# Patient Record
Sex: FEMALE | HISPANIC OR LATINO | Employment: STUDENT | URBAN - METROPOLITAN AREA
[De-identification: names, ages, dates, MRNs, and addresses within clinical notes are randomized per-mention and may not be internally consistent; named-entity substitution may affect disease eponyms.]

---

## 2021-07-23 ENCOUNTER — HOSPITAL ENCOUNTER (OUTPATIENT)
Dept: CARDIOLOGY | Facility: HOSPITAL | Age: 26
Discharge: HOME | End: 2021-07-23

## 2021-07-23 ENCOUNTER — OFFICE VISIT (OUTPATIENT)
Dept: CARDIOLOGY | Facility: HOSPITAL | Age: 26
End: 2021-07-23

## 2021-07-23 ENCOUNTER — TELEPHONE (OUTPATIENT)
Dept: CARDIOLOGY | Facility: HOSPITAL | Age: 26
End: 2021-07-23

## 2021-07-23 VITALS
TEMPERATURE: 97.9 F | OXYGEN SATURATION: 98 % | RESPIRATION RATE: 18 BRPM | HEIGHT: 70 IN | HEART RATE: 92 BPM | BODY MASS INDEX: 27.54 KG/M2 | SYSTOLIC BLOOD PRESSURE: 131 MMHG | WEIGHT: 192.38 LBS | DIASTOLIC BLOOD PRESSURE: 79 MMHG

## 2021-07-23 DIAGNOSIS — R06.02 SHORTNESS OF BREATH: ICD-10-CM

## 2021-07-23 DIAGNOSIS — R55 NEAR SYNCOPE: ICD-10-CM

## 2021-07-23 DIAGNOSIS — I10 ESSENTIAL HYPERTENSION: ICD-10-CM

## 2021-07-23 DIAGNOSIS — R00.2 PALPITATIONS: ICD-10-CM

## 2021-07-23 LAB
QT INTERVAL: 346 MS
QTC INTERVAL: 437 MS

## 2021-07-23 PROCEDURE — 93010 ELECTROCARDIOGRAM REPORT: CPT | Performed by: INTERNAL MEDICINE

## 2021-07-23 PROCEDURE — 93005 ELECTROCARDIOGRAM TRACING: CPT | Performed by: NURSE PRACTITIONER

## 2021-07-23 PROCEDURE — 99204 OFFICE O/P NEW MOD 45 MIN: CPT | Performed by: NURSE PRACTITIONER

## 2021-07-23 PROCEDURE — 93246 EXT ECG>7D<15D RECORDING: CPT

## 2021-07-23 RX ORDER — METOPROLOL SUCCINATE 25 MG/1
25 TABLET, EXTENDED RELEASE ORAL DAILY
Qty: 30 TABLET | Refills: 3 | Status: SHIPPED | OUTPATIENT
Start: 2021-07-23 | End: 2021-09-08 | Stop reason: SDUPTHER

## 2021-07-23 RX ORDER — NORGESTIMATE AND ETHINYL ESTRADIOL
1 KIT DAILY
COMMUNITY
Start: 2021-07-03 | End: 2022-06-29

## 2021-07-23 NOTE — PROGRESS NOTES
[facility] Heart Monitor Documentation    Laura Albarado  1995  [id number]  07/23/21    ADRIÁN Serna    [] ZIO XT Patch  Model [id number] Prescribed for N/A Days    · Serial Number: (N + 9 Digits) N   · Apply-By Date on Box:   · USPS Tracking Number:   · USPS Tracking        [] [employer] MINI PLUS Mobile Cardiac Telemetry  Model BGMINIPLUS Prescribed for N/A Days    · Serial Number: (BGM + 7 Digits) BGM  · Shipped-By Date on Box:   · UPS Tracking Number: [id number]  · UPS Tracking      [x] [employer] MINI Holter Monitor  Model BGMINIEL Prescribed for 14 Days    · Serial Number: (7 Digits) [id number]  · Shipped-By Date on Box: 07/20/21  · UPS Tracking Number: [id number]  · UPS Tracking        This monitor was applied to the patient's chest and checked for proper functioning.  Ms. Laura Albarado was instructed in the proper use of this monitor.  She was given the opportunity to ask questions and left the office with the device 's instruction manual.    Cherelle Tucker LPN, 15:46 EDT, 07/23/21                  [facility]

## 2021-07-23 NOTE — TELEPHONE ENCOUNTER
Please request last labs and office note with PCP (espec:  CBC, BMP/CMP, TSH, Lipids)    Please request last echo, heart monitor, and cardiology note from [facility].  Pt does not remember the name of the cardiologist.

## 2021-07-23 NOTE — PROGRESS NOTES
"[facility], [facility] Heart and Vascular    Chief Complaint  Palpitations    Subjective    History of Present Illness {CC  Problem List  Visit  Diagnosis   Encounters  Notes  Medications  Labs  Result Review Imaging  Media :23}     Laura Albarado presents to [facility] CARDIOLOGY for   History of Present Illness     27 yo female  Hx of palpitations, syncope when 17 years old.  \"intermittent VT\".  Treated at that time with BB.    Over the last year has noted activity intolerance, worsening dyspnea, palpations, fatigue, and chest discomfort, feelings of near syncope.  Weight gain noted. Elevated Blood pressure.     Pt reports recent elevated LFT and cortisol levels.  Thyroid, blood count, kidney function has been normal per report.    Patient currently in PA school.  States she is doing well.  Denies anxiety or worsening stressors.  States she is handling school well.    Reports having Covid in November.    Tries to exercise routinely, but has not had the energy to exercise as she would like.  Reports poor quality sleep.         Objective     Vital Signs:   Vitals:    07/23/21 1520 07/23/21 1522 07/23/21 1523   BP: 132/83 131/79 131/79   BP Location: Right arm Left arm Left arm   Patient Position: Sitting Standing Sitting   Cuff Size: Adult Adult Adult   Pulse: 97 104 92   Resp:   18   Temp:   97.9 °F (36.6 °C)   TempSrc:   Temporal   SpO2: 98% 98% 98%   Weight:   87.3 kg (192 lb 6 oz)   Height:   177.8 cm (70\")     Body mass index is 27.6 kg/m².  Physical Exam  Vitals reviewed.   Constitutional:       General: She is not in acute distress.     Appearance: Normal appearance.   Cardiovascular:      Rate and Rhythm: Normal rate and regular rhythm.      Pulses:           Radial pulses are 2+ on the right side.        Dorsalis pedis pulses are 2+ on the right side.        Posterior tibial pulses are 2+ on the right side.      Heart sounds: Normal heart sounds. "   Pulmonary:      Effort: Pulmonary effort is normal.      Breath sounds: Normal breath sounds.   Abdominal:      Palpations: Abdomen is soft.      Tenderness: There is no abdominal tenderness.   Musculoskeletal:      Right lower leg: No edema.      Left lower leg: No edema.   Skin:     General: Skin is warm and dry.   Neurological:      Mental Status: She is alert.   Psychiatric:         Mood and Affect: Mood normal.         Behavior: Behavior is cooperative.              Result Review  Data Reviewed:{ Labs  Result Review  Imaging  Med Tab  Media :23}     Request medical records from PCP and previous cardiology.     Assessment and Plan {CC Problem List  Visit Diagnosis  ROS  Review (Popup)  Health Maintenance  Quality  BestPractice  Medications  SmartSets  SnapShot Encounters  Media :23}   1. Palpitations    - metoprolol succinate XL (TOPROL-XL) 25 MG 24 hr tablet; Take 1 tablet by mouth Daily.  Dispense: 30 tablet; Refill: 3    2. Near syncope    - Holter Monitor - 72 Hour Up To 15 Days; Future    3. Shortness of breath    - Adult Transthoracic Echo Complete W/ Cont if Necessary Per Protocol; Future    4. Essential hypertension    - ECG 12 Lead; NSR 96 bpm          Follow Up {Instructions Charge Capture  Follow-up Communications :23}   Return in about 6 weeks (around 9/3/2021) for Video visit, palpitations, monitor results.    Patient was given instructions and counseling regarding her condition or for health maintenance advice. Please see specific information pulled into the AVS if appropriate.  Patient was instructed to call the [facility] with any questions, concerns, or worsening symptoms.    *Please note that portions of this note were completed with a voice recognition program. Efforts were made to edit the dictations, but occasionally words are mistranscribed.

## 2021-07-27 ENCOUNTER — TELEPHONE (OUTPATIENT)
Dept: CARDIOLOGY | Facility: HOSPITAL | Age: 26
End: 2021-07-27

## 2021-07-27 PROBLEM — F98.8 ADD (ATTENTION DEFICIT DISORDER): Status: ACTIVE | Noted: 2021-07-27

## 2021-07-27 NOTE — TELEPHONE ENCOUNTER
Received medical records from primary care provider for review    Labs completed on 5/3/2021:    Cortisol/a.m. 37.7    Plasma ACTH 34.5    Urinalysis negative    TSH 2.59    Total cholesterol 211, HDL 79, triglycerides 156, , VLDL 27    Glucose 82, BUN 14, creatinine 0.8, estimated , sodium 140, potassium 4.8, chloride 104, carbon dioxide 23, , AST 59    WBC 8.8, hemoglobin 13.5, hematocrit 40.0, platelet 326

## 2021-08-24 ENCOUNTER — HOSPITAL ENCOUNTER (OUTPATIENT)
Dept: CARDIOLOGY | Facility: HOSPITAL | Age: 26
Discharge: HOME | End: 2021-08-24
Admitting: NURSE PRACTITIONER

## 2021-08-24 VITALS — BODY MASS INDEX: 27.49 KG/M2 | HEIGHT: 70 IN | WEIGHT: 192 LBS

## 2021-08-24 DIAGNOSIS — R06.02 SHORTNESS OF BREATH: ICD-10-CM

## 2021-08-24 LAB
ASCENDING AORTA: 2.2 CM
BH CV ECHO MEAS - AO MAX PG (FULL): 1.6 MMHG
BH CV ECHO MEAS - AO MAX PG: 5 MMHG
BH CV ECHO MEAS - AO MEAN PG (FULL): 1 MMHG
BH CV ECHO MEAS - AO MEAN PG: 3 MMHG
BH CV ECHO MEAS - AO ROOT AREA (BSA CORRECTED): 1.1
BH CV ECHO MEAS - AO ROOT AREA: 4.2 CM^2
BH CV ECHO MEAS - AO ROOT DIAM: 2.3 CM
BH CV ECHO MEAS - AO V2 MAX: 113 CM/SEC
BH CV ECHO MEAS - AO V2 MEAN: 82.6 CM/SEC
BH CV ECHO MEAS - AO V2 VTI: 24.6 CM
BH CV ECHO MEAS - ASC AORTA: 2.2 CM
BH CV ECHO MEAS - AVA(I,A): 2.5 CM^2
BH CV ECHO MEAS - AVA(I,D): 2.5 CM^2
BH CV ECHO MEAS - AVA(V,A): 2.6 CM^2
BH CV ECHO MEAS - AVA(V,D): 2.6 CM^2
BH CV ECHO MEAS - BSA(HAYCOCK): 2.1 M^2
BH CV ECHO MEAS - BSA: 2.1 M^2
BH CV ECHO MEAS - BZI_BMI: 27.5 KILOGRAMS/M^2
BH CV ECHO MEAS - BZI_METRIC_HEIGHT: 177.8 CM
BH CV ECHO MEAS - BZI_METRIC_WEIGHT: 87.1 KG
BH CV ECHO MEAS - EDV(CUBED): 117.6 ML
BH CV ECHO MEAS - EDV(MOD-SP2): 105 ML
BH CV ECHO MEAS - EDV(MOD-SP4): 102 ML
BH CV ECHO MEAS - EDV(TEICH): 112.8 ML
BH CV ECHO MEAS - EF(CUBED): 74.7 %
BH CV ECHO MEAS - EF(MOD-BP): 66.5 %
BH CV ECHO MEAS - EF(MOD-SP2): 63.5 %
BH CV ECHO MEAS - EF(MOD-SP4): 68.8 %
BH CV ECHO MEAS - EF(TEICH): 66.4 %
BH CV ECHO MEAS - ESV(CUBED): 29.8 ML
BH CV ECHO MEAS - ESV(MOD-SP2): 38.3 ML
BH CV ECHO MEAS - ESV(MOD-SP4): 31.8 ML
BH CV ECHO MEAS - ESV(TEICH): 37.9 ML
BH CV ECHO MEAS - FS: 36.7 %
BH CV ECHO MEAS - IVS/LVPW: 0.8
BH CV ECHO MEAS - IVSD: 0.8 CM
BH CV ECHO MEAS - LA DIMENSION: 3.2 CM
BH CV ECHO MEAS - LA/AO: 1.4
BH CV ECHO MEAS - LAD MAJOR: 4.9 CM
BH CV ECHO MEAS - LAT PEAK E' VEL: 20.4 CM/SEC
BH CV ECHO MEAS - LATERAL E/E' RATIO: 4.5
BH CV ECHO MEAS - LV DIASTOLIC VOL/BSA (35-75): 49.7 ML/M^2
BH CV ECHO MEAS - LV IVRT: 0.06 SEC
BH CV ECHO MEAS - LV MASS(C)D: 153 GRAMS
BH CV ECHO MEAS - LV MASS(C)DI: 74.6 GRAMS/M^2
BH CV ECHO MEAS - LV MAX PG: 3.4 MMHG
BH CV ECHO MEAS - LV MEAN PG: 2 MMHG
BH CV ECHO MEAS - LV SYSTOLIC VOL/BSA (12-30): 15.5 ML/M^2
BH CV ECHO MEAS - LV V1 MAX: 92 CM/SEC
BH CV ECHO MEAS - LV V1 MEAN: 64.9 CM/SEC
BH CV ECHO MEAS - LV V1 VTI: 19.9 CM
BH CV ECHO MEAS - LVIDD: 4.9 CM
BH CV ECHO MEAS - LVIDS: 3.1 CM
BH CV ECHO MEAS - LVLD AP2: 9 CM
BH CV ECHO MEAS - LVLD AP4: 8.5 CM
BH CV ECHO MEAS - LVLS AP2: 7.3 CM
BH CV ECHO MEAS - LVLS AP4: 7 CM
BH CV ECHO MEAS - LVOT AREA (M): 3.1 CM^2
BH CV ECHO MEAS - LVOT AREA: 3.1 CM^2
BH CV ECHO MEAS - LVOT DIAM: 2 CM
BH CV ECHO MEAS - LVPWD: 1 CM
BH CV ECHO MEAS - MED PEAK E' VEL: 13.7 CM/SEC
BH CV ECHO MEAS - MEDIAL E/E' RATIO: 6.7
BH CV ECHO MEAS - MR MAX PG: 25 MMHG
BH CV ECHO MEAS - MR MAX VEL: 248 CM/SEC
BH CV ECHO MEAS - MV A MAX VEL: 51.4 CM/SEC
BH CV ECHO MEAS - MV DEC SLOPE: 313 CM/SEC^2
BH CV ECHO MEAS - MV DEC TIME: 0.24 SEC
BH CV ECHO MEAS - MV E MAX VEL: 92.1 CM/SEC
BH CV ECHO MEAS - MV E/A: 1.8
BH CV ECHO MEAS - MV MAX PG: 3.6 MMHG
BH CV ECHO MEAS - MV MEAN PG: 2 MMHG
BH CV ECHO MEAS - MV P1/2T MAX VEL: 98.4 CM/SEC
BH CV ECHO MEAS - MV P1/2T: 92.1 MSEC
BH CV ECHO MEAS - MV V2 MAX: 94.7 CM/SEC
BH CV ECHO MEAS - MV V2 MEAN: 57.8 CM/SEC
BH CV ECHO MEAS - MV V2 VTI: 31.2 CM
BH CV ECHO MEAS - MVA P1/2T LCG: 2.2 CM^2
BH CV ECHO MEAS - MVA(P1/2T): 2.4 CM^2
BH CV ECHO MEAS - MVA(VTI): 2 CM^2
BH CV ECHO MEAS - PA ACC TIME: 0.18 SEC
BH CV ECHO MEAS - PA MAX PG: 3.8 MMHG
BH CV ECHO MEAS - PA PR(ACCEL): -2 MMHG
BH CV ECHO MEAS - PA V2 MAX: 96.8 CM/SEC
BH CV ECHO MEAS - PI END-D VEL: 100 CM/SEC
BH CV ECHO MEAS - RAP SYSTOLE: 3 MMHG
BH CV ECHO MEAS - RVSP: 12 MMHG
BH CV ECHO MEAS - SI(AO): 49.8 ML/M^2
BH CV ECHO MEAS - SI(CUBED): 42.8 ML/M^2
BH CV ECHO MEAS - SI(LVOT): 30.5 ML/M^2
BH CV ECHO MEAS - SI(MOD-SP2): 32.5 ML/M^2
BH CV ECHO MEAS - SI(MOD-SP4): 34.2 ML/M^2
BH CV ECHO MEAS - SI(TEICH): 36.5 ML/M^2
BH CV ECHO MEAS - SV(AO): 102.2 ML
BH CV ECHO MEAS - SV(CUBED): 87.9 ML
BH CV ECHO MEAS - SV(LVOT): 62.5 ML
BH CV ECHO MEAS - SV(MOD-SP2): 66.7 ML
BH CV ECHO MEAS - SV(MOD-SP4): 70.2 ML
BH CV ECHO MEAS - SV(TEICH): 74.9 ML
BH CV ECHO MEAS - TAPSE (>1.6): 1.8 CM
BH CV ECHO MEAS - TR MAX PG: 9 MMHG
BH CV ECHO MEAS - TR MAX VEL: 146 CM/SEC
BH CV ECHO MEASUREMENTS AVERAGE E/E' RATIO: 5.4
BH CV VAS BP LEFT ARM: NORMAL MMHG
BH CV XLRA - RV BASE: 3.7 CM
BH CV XLRA - RV LENGTH: 6.4 CM
BH CV XLRA - RV MID: 3.3 CM
BH CV XLRA - TDI S': 13.4 CM/SEC
IVRT: 55 MSEC
LEFT ATRIUM VOLUME INDEX: 18.6 ML/M^2
LEFT ATRIUM VOLUME: 38.2 ML
LV EF 2D ECHO EST: 60 %

## 2021-08-24 PROCEDURE — 93306 TTE W/DOPPLER COMPLETE: CPT

## 2021-08-24 PROCEDURE — 93306 TTE W/DOPPLER COMPLETE: CPT | Performed by: INTERNAL MEDICINE

## 2021-08-25 PROBLEM — R00.2 PALPITATIONS: Status: ACTIVE | Noted: 2021-08-25

## 2021-09-01 ENCOUNTER — APPOINTMENT (OUTPATIENT)
Dept: CARDIOLOGY | Facility: HOSPITAL | Age: 26
End: 2021-09-01

## 2021-09-01 NOTE — PROGRESS NOTES
[facility], [facility] Heart and Vascular    This was an audio and video enabled telemedicine encounter.    Chief Complaint  No chief complaint on file.    Subjective    History of Present Illness {CC  Problem List  Visit  Diagnosis   Encounters  Notes  Medications  Labs  Result Review Imaging  Media :23}     Laura Albarado presents to [facility] CARDIOLOGY for   History of Present Illness     26-year-old female with palpitations, history of syncope since she was 17 years old.    Over the last year complaining of activity intolerance, dyspnea, palpitations, fatigue, chest discomfort, feelings of near syncope.  Reports blood pressures have been elevated.  Noted weight gain.    Patient is currently in physician assistant school.    Covid noted in November 2020..  Attempt to exercise routinely but has not had the energy to exercise as she would like.  Reports poor sleep quality.    Last office visit patient was started on metoprolol for palpitations.  Placed on Holter, echo ordered.  Objective     Vital Signs:   There were no vitals filed for this visit.  There is no height or weight on file to calculate BMI.  Physical Exam         Result Review  Data Reviewed:{ Labs  Result Review  Imaging  Med Tab  Media :23}   Echocardiogram 8/24/2021: EF 60%, cardiac valves anatomically and functionally normal    Extended 14-day Holter 7/23/2021: Average heart rate 75 bpm, rare PACs less than 1%.  Patient activated symptoms  {Data reviewed (Optional):83779}            Assessment and Plan {CC Problem List  Visit Diagnosis  ROS  Review (Popup)  Health Maintenance  Quality  BestPractice  Medications  SmartSets  SnapShot Encounters  Media :23}   1. Palpitations  ***    2. Shortness of breath  ***    3. Near syncope  ***    4. Essential hypertension  ***      {Time Spent (Optional):13134}    Follow Up {Instructions Charge Capture  Follow-up Communications :23}   No  follow-ups on file.    Patient was given instructions and counseling regarding her condition or for health maintenance advice. Please see specific information pulled into the AVS if appropriate.  Patient was instructed to call the [facility] with any questions, concerns, or worsening symptoms.    *Please note that portions of this note were completed with a voice recognition program. Efforts were made to edit the dictations, but occasionally words are mistranscribed.

## 2021-09-02 ENCOUNTER — APPOINTMENT (OUTPATIENT)
Dept: CARDIOLOGY | Facility: HOSPITAL | Age: 26
End: 2021-09-02

## 2021-09-08 ENCOUNTER — TELEMEDICINE (OUTPATIENT)
Dept: CARDIOLOGY | Facility: HOSPITAL | Age: 26
End: 2021-09-08

## 2021-09-08 VITALS — HEIGHT: 70 IN | HEART RATE: 80 BPM | BODY MASS INDEX: 26.92 KG/M2 | WEIGHT: 188 LBS | TEMPERATURE: 97.8 F

## 2021-09-08 DIAGNOSIS — R06.02 SHORTNESS OF BREATH: ICD-10-CM

## 2021-09-08 DIAGNOSIS — R00.2 PALPITATIONS: Primary | ICD-10-CM

## 2021-09-08 DIAGNOSIS — I10 ESSENTIAL HYPERTENSION: ICD-10-CM

## 2021-09-08 PROCEDURE — 99214 OFFICE O/P EST MOD 30 MIN: CPT | Performed by: NURSE PRACTITIONER

## 2021-09-08 RX ORDER — METOPROLOL SUCCINATE 25 MG/1
25 TABLET, EXTENDED RELEASE ORAL DAILY
Qty: 30 TABLET | Refills: 6 | Status: SHIPPED | OUTPATIENT
Start: 2021-09-08

## 2021-09-08 RX ORDER — BUPROPION HYDROCHLORIDE 150 MG/1
1 TABLET ORAL DAILY
COMMUNITY
Start: 2021-07-27 | End: 2022-06-29

## 2021-09-08 NOTE — PROGRESS NOTES
"[facility], [facility] Heart and Vascular    This was an audio and video enabled telemedicine encounter.    Chief Complaint  Follow-up (monitor results)    Subjective    History of Present Illness {CC  Problem List  Visit  Diagnosis   Encounters  Notes  Medications  Labs  Result Review Imaging  Media :23}     Laura Albarado presents to [facility] CARDIOLOGY for   History of Present Illness       26-year-old female with palpitations, history of syncope since she was 17 years old.    Over the last year complaining of activity intolerance, dyspnea, palpitations, fatigue, chest discomfort, feelings of near syncope.  Reports blood pressures have been elevated.  Noted weight gain.    Patient is currently in physician assistant school.    Covid noted in November 2020.  Attempt to exercise routinely but has not had the energy to exercise as she would like.  Reports poor sleep quality.    Last office visit patient was started on metoprolol for palpitations.  Placed on Holter, echo ordered.    Patient reports improvement of palpitations and dizziness while being on metoprolol.  She denies near syncope, syncope, chest pain, pressure, dyspnea.    Patient has not been keeping a home blood pressure log.    Objective     Vital Signs:   Vitals:    09/08/21 1347   Pulse: 80   Temp: 97.8 °F (36.6 °C)   TempSrc: Oral   Weight: 85.3 kg (188 lb)   Height: 177.8 cm (70\")     Body mass index is 26.98 kg/m².  Physical Exam  Vitals reviewed.   Constitutional:       General: She is not in acute distress.  Pulmonary:      Effort: Pulmonary effort is normal.   Skin:     Coloration: Skin is not pale.   Neurological:      Mental Status: She is alert.   Psychiatric:         Mood and Affect: Mood normal.         Behavior: Behavior normal. Behavior is cooperative.              Result Review  Data Reviewed:{ Labs  Result Review  Imaging  Med Tab  Media :23}   Echocardiogram 8/24/2021: EF 60%, " cardiac valves anatomically and functionally normal    Extended 14-day Holter 7/23/2021: Average heart rate 75 bpm, rare PACs less than 1%.  Patient activated symptoms primarily associated with sinus.  Assessment and Plan {CC Problem List  Visit Diagnosis  ROS  Review (Popup)  Health Maintenance  Quality  BestPractice  Medications  SmartSets  SnapShot Encounters  Media :23}   1. Palpitations  Reviewed heart monitor results.  No significant arrhythmias.  Palpitations improved with beta-blocker.    We will continue with refills    2. Essential hypertension  Patient not been keeping a home blood pressure log, but will start.  Denies signs and symptoms associated with hypotension.    3. Shortness of breath  No significant dyspnea.  Echo normal.    Patient will follow up in 3 months or sooner if needed      I spent 20 minutes caring for Laura on this date of service. This time includes time spent by me in the following activities:preparing for the visit, reviewing tests, performing a medically appropriate examination and/or evaluation , counseling and educating the patient/family/caregiver, ordering medications, tests, or procedures and documenting information in the medical record    Follow Up {Instructions Charge Capture  Follow-up Communications :23}   Return in about 3 months (around 12/8/2021) for Video visit, palpitations, HTN.    Patient was given instructions and counseling regarding her condition or for health maintenance advice. Please see specific information pulled into the AVS if appropriate.  Patient was instructed to call the [facility] with any questions, concerns, or worsening symptoms.    *Please note that portions of this note were completed with a voice recognition program. Efforts were made to edit the dictations, but occasionally words are mistranscribed.

## 2021-09-17 PROCEDURE — 93248 EXT ECG>7D<15D REV&INTERPJ: CPT | Performed by: INTERNAL MEDICINE

## 2021-11-05 ENCOUNTER — OFFICE VISIT (OUTPATIENT)
Dept: INTERNAL MEDICINE | Facility: CLINIC | Age: 26
End: 2021-11-05

## 2021-11-05 ENCOUNTER — LAB (OUTPATIENT)
Dept: LAB | Facility: HOSPITAL | Age: 26
End: 2021-11-05

## 2021-11-05 VITALS
HEIGHT: 70 IN | RESPIRATION RATE: 16 BRPM | TEMPERATURE: 97.7 F | BODY MASS INDEX: 28.66 KG/M2 | WEIGHT: 200.2 LBS | OXYGEN SATURATION: 98 % | HEART RATE: 78 BPM | DIASTOLIC BLOOD PRESSURE: 84 MMHG | SYSTOLIC BLOOD PRESSURE: 140 MMHG

## 2021-11-05 DIAGNOSIS — R63.5 ABNORMAL WEIGHT GAIN: Primary | ICD-10-CM

## 2021-11-05 DIAGNOSIS — R79.89 ELEVATED LFTS: ICD-10-CM

## 2021-11-05 DIAGNOSIS — R53.82 CHRONIC FATIGUE: ICD-10-CM

## 2021-11-05 DIAGNOSIS — L68.0 HIRSUTISM: ICD-10-CM

## 2021-11-05 DIAGNOSIS — N92.6 MENSTRUAL IRREGULARITY: ICD-10-CM

## 2021-11-05 DIAGNOSIS — R00.2 PALPITATIONS: ICD-10-CM

## 2021-11-05 DIAGNOSIS — Z30.9 ENCOUNTER FOR CONTRACEPTIVE MANAGEMENT, UNSPECIFIED TYPE: ICD-10-CM

## 2021-11-05 DIAGNOSIS — F98.8 ATTENTION DEFICIT DISORDER, UNSPECIFIED HYPERACTIVITY PRESENCE: ICD-10-CM

## 2021-11-05 DIAGNOSIS — R79.89 HIGH SERUM CORTISOL: ICD-10-CM

## 2021-11-05 DIAGNOSIS — R63.5 ABNORMAL WEIGHT GAIN: ICD-10-CM

## 2021-11-05 DIAGNOSIS — Z87.42 HISTORY OF OVARIAN CYST: ICD-10-CM

## 2021-11-05 LAB
25(OH)D3 SERPL-MCNC: 27.8 NG/ML
ALBUMIN SERPL-MCNC: 4.7 G/DL (ref 3.5–5.2)
ALBUMIN/GLOB SERPL: 1.5 G/DL
ALP SERPL-CCNC: 75 U/L (ref 39–117)
ALT SERPL W P-5'-P-CCNC: 266 U/L (ref 1–33)
ANION GAP SERPL CALCULATED.3IONS-SCNC: 11 MMOL/L (ref 5–15)
AST SERPL-CCNC: 112 U/L (ref 1–32)
BILIRUB SERPL-MCNC: 0.4 MG/DL (ref 0–1.2)
BUN SERPL-MCNC: 9 MG/DL (ref 6–20)
BUN/CREAT SERPL: 12 (ref 7–25)
CALCIUM SPEC-SCNC: 9.4 MG/DL (ref 8.6–10.5)
CHLORIDE SERPL-SCNC: 100 MMOL/L (ref 98–107)
CO2 SERPL-SCNC: 25 MMOL/L (ref 22–29)
CREAT SERPL-MCNC: 0.75 MG/DL (ref 0.57–1)
DEPRECATED RDW RBC AUTO: 39.2 FL (ref 37–54)
ERYTHROCYTE [DISTWIDTH] IN BLOOD BY AUTOMATED COUNT: 12.2 % (ref 12.3–15.4)
GFR SERPL CREATININE-BSD FRML MDRD: 112 ML/MIN/1.73
GFR SERPL CREATININE-BSD FRML MDRD: 92 ML/MIN/1.73
GLOBULIN UR ELPH-MCNC: 3.2 GM/DL
GLUCOSE SERPL-MCNC: 81 MG/DL (ref 65–99)
HCT VFR BLD AUTO: 38.9 % (ref 34–46.6)
HGB BLD-MCNC: 13.6 G/DL (ref 12–15.9)
MCH RBC QN AUTO: 30.8 PG (ref 26.6–33)
MCHC RBC AUTO-ENTMCNC: 35 G/DL (ref 31.5–35.7)
MCV RBC AUTO: 88.2 FL (ref 79–97)
PLATELET # BLD AUTO: 369 10*3/MM3 (ref 140–450)
PMV BLD AUTO: 11.5 FL (ref 6–12)
POTASSIUM SERPL-SCNC: 4.2 MMOL/L (ref 3.5–5.2)
PROT SERPL-MCNC: 7.9 G/DL (ref 6–8.5)
RBC # BLD AUTO: 4.41 10*6/MM3 (ref 3.77–5.28)
SODIUM SERPL-SCNC: 136 MMOL/L (ref 136–145)
TSH SERPL DL<=0.05 MIU/L-ACNC: 1.82 UIU/ML (ref 0.27–4.2)
VIT B12 BLD-MCNC: 412 PG/ML (ref 211–946)
WBC # BLD AUTO: 7.22 10*3/MM3 (ref 3.4–10.8)

## 2021-11-05 PROCEDURE — 84403 ASSAY OF TOTAL TESTOSTERONE: CPT

## 2021-11-05 PROCEDURE — 85027 COMPLETE CBC AUTOMATED: CPT

## 2021-11-05 PROCEDURE — 84443 ASSAY THYROID STIM HORMONE: CPT

## 2021-11-05 PROCEDURE — 82306 VITAMIN D 25 HYDROXY: CPT

## 2021-11-05 PROCEDURE — 82607 VITAMIN B-12: CPT

## 2021-11-05 PROCEDURE — 84402 ASSAY OF FREE TESTOSTERONE: CPT

## 2021-11-05 PROCEDURE — 99204 OFFICE O/P NEW MOD 45 MIN: CPT | Performed by: INTERNAL MEDICINE

## 2021-11-05 PROCEDURE — 80053 COMPREHEN METABOLIC PANEL: CPT

## 2021-11-05 RX ORDER — DEXAMETHASONE 1 MG
1 TABLET ORAL ONCE
Qty: 1 TABLET | Refills: 0 | Status: SHIPPED | OUTPATIENT
Start: 2021-11-05 | End: 2021-11-05
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
## 2021-11-07 DIAGNOSIS — K76.9 HEPATOCELLULAR INJURY: Primary | ICD-10-CM

## 2021-11-07 PROBLEM — E55.9 VITAMIN D INSUFFICIENCY: Status: ACTIVE | Noted: 2021-11-07

## 2021-11-09 ENCOUNTER — LAB (OUTPATIENT)
Dept: LAB | Facility: HOSPITAL | Age: 26
End: 2021-11-09

## 2021-11-09 DIAGNOSIS — K76.9 HEPATOCELLULAR INJURY: ICD-10-CM

## 2021-11-09 DIAGNOSIS — R79.89 HIGH SERUM CORTISOL: ICD-10-CM

## 2021-11-09 DIAGNOSIS — R63.5 ABNORMAL WEIGHT GAIN: ICD-10-CM

## 2021-11-09 LAB
FERRITIN SERPL-MCNC: 146 NG/ML (ref 13–150)
HBV SURFACE AB SER RIA-ACNC: REACTIVE
HBV SURFACE AG SERPL QL IA: NORMAL
HCV AB SER DONR QL: NORMAL
IRON 24H UR-MRATE: 118 MCG/DL (ref 37–145)
IRON SATN MFR SERPL: 24 % (ref 20–50)
TIBC SERPL-MCNC: 489 MCG/DL (ref 298–536)
TRANSFERRIN SERPL-MCNC: 328 MG/DL (ref 200–360)

## 2021-11-09 PROCEDURE — 83516 IMMUNOASSAY NONANTIBODY: CPT

## 2021-11-09 PROCEDURE — 86706 HEP B SURFACE ANTIBODY: CPT

## 2021-11-09 PROCEDURE — 86704 HEP B CORE ANTIBODY TOTAL: CPT

## 2021-11-09 PROCEDURE — 86803 HEPATITIS C AB TEST: CPT

## 2021-11-09 PROCEDURE — 82728 ASSAY OF FERRITIN: CPT

## 2021-11-09 PROCEDURE — 80299 QUANTITATIVE ASSAY DRUG: CPT

## 2021-11-09 PROCEDURE — 82533 TOTAL CORTISOL: CPT

## 2021-11-09 PROCEDURE — 86376 MICROSOMAL ANTIBODY EACH: CPT

## 2021-11-09 PROCEDURE — 87340 HEPATITIS B SURFACE AG IA: CPT

## 2021-11-09 PROCEDURE — 83540 ASSAY OF IRON: CPT

## 2021-11-09 PROCEDURE — 84466 ASSAY OF TRANSFERRIN: CPT

## 2021-11-10 LAB
ACTIN IGG SERPL-ACNC: 6 UNITS (ref 0–19)
HBV CORE AB SERPL QL IA: NEGATIVE
LKM-1 AB SER-ACNC: 2.7 UNITS (ref 0–20)
MITOCHONDRIA M2 IGG SER-ACNC: <20 UNITS (ref 0–20)
TESTOST FREE SERPL-MCNC: 2.8 PG/ML (ref 0–4.2)
TESTOST SERPL-MCNC: 67.1 NG/DL (ref 10–55)

## 2021-11-17 LAB
CORTIS SERPL-MCNC: 16 UG/DL
DEXAMETHASONE REFLEX: NORMAL
DEXAMETHASONE SERPL-MCNC: 698 NG/DL

## 2021-11-18 DIAGNOSIS — R63.5 ABNORMAL WEIGHT GAIN: ICD-10-CM

## 2021-11-18 DIAGNOSIS — R94.7 ABNORMAL DEXAMETHASONE SUPPRESSION TEST: Primary | ICD-10-CM

## 2021-12-06 ENCOUNTER — LAB (OUTPATIENT)
Dept: LAB | Facility: HOSPITAL | Age: 26
End: 2021-12-06

## 2021-12-06 ENCOUNTER — OFFICE VISIT (OUTPATIENT)
Dept: ENDOCRINOLOGY | Facility: CLINIC | Age: 26
End: 2021-12-06

## 2021-12-06 VITALS
HEART RATE: 73 BPM | WEIGHT: 202 LBS | SYSTOLIC BLOOD PRESSURE: 126 MMHG | OXYGEN SATURATION: 99 % | HEIGHT: 70 IN | BODY MASS INDEX: 28.92 KG/M2 | DIASTOLIC BLOOD PRESSURE: 80 MMHG

## 2021-12-06 DIAGNOSIS — R79.89 HIGH SERUM CORTISOL: ICD-10-CM

## 2021-12-06 DIAGNOSIS — R79.89 HIGH SERUM CORTISOL: Primary | ICD-10-CM

## 2021-12-06 LAB — CORTIS SERPL-MCNC: 13.99 MCG/DL

## 2021-12-06 PROCEDURE — 82024 ASSAY OF ACTH: CPT

## 2021-12-06 PROCEDURE — 82533 TOTAL CORTISOL: CPT

## 2021-12-06 PROCEDURE — 99204 OFFICE O/P NEW MOD 45 MIN: CPT | Performed by: INTERNAL MEDICINE

## 2021-12-06 NOTE — PROGRESS NOTES
"Germania Albarado is a 26 y.o. female who presents to establish care for Abnormal Lab (high cortisol)    Chief complaint: weight gain      History of Present Illness     She has noted weight gain for the last couple of years despite calorie restriction and exercise.  She has noted worsening in the last 9 months.  She apparently had elevated AM serum cortisol.  She was seen at  Endocrinology and had overnight dexamethasone suppression test that was reportedly normal.  She has noted irregular periods and hirsutism.  She has been on OCPs.  She stopped the OCPs about 6 months ago.  Testosterone was mildly elevated.  She has been referred to Gyn for possible PCOS.  She had another dex suppression test with her PCP last month.  The cortisol was 16.  The dexamethasone level was high also.  She denies any hypokalemia.  She had CMP last month.  K+ was normal but LFTs were quite high.  She denies any steroid use.  She notes some stretch marks on her thighs.  She notes easy bruising.     The following portions of the patient's history were reviewed and updated as appropriate: allergies, current medications, past family history, past medical history, past social history, past surgical history and problem list.    Review of Systems   Constitutional: Positive for appetite change, fatigue and unexpected weight change.   Eyes: Negative.    Respiratory: Negative.    Cardiovascular: Positive for palpitations.   Gastrointestinal: Positive for nausea and vomiting.   Endocrine: Positive for polyphagia.   Genitourinary: Positive for menstrual problem and pelvic pain.   Musculoskeletal: Positive for myalgias.   Skin: Negative.         Hair Loss   Allergic/Immunologic: Negative.    Neurological: Positive for headaches.   Hematological: Negative.    Psychiatric/Behavioral: Positive for sleep disturbance.       Objective   Visit Vitals  /80   Pulse 73   Ht 177.8 cm (70\")   Wt 91.6 kg (202 lb)   SpO2 99%   BMI 28.98 kg/m²    "   Physical Exam  Constitutional:       Appearance: Normal appearance.   HENT:      Head: Normocephalic and atraumatic.   Eyes:      Extraocular Movements: Extraocular movements intact.      Conjunctiva/sclera: Conjunctivae normal.      Pupils: Pupils are equal, round, and reactive to light.   Cardiovascular:      Rate and Rhythm: Normal rate and regular rhythm.      Pulses: Normal pulses.      Heart sounds: Normal heart sounds.   Pulmonary:      Effort: Pulmonary effort is normal.      Breath sounds: Normal breath sounds.   Abdominal:      General: Bowel sounds are normal.      Palpations: Abdomen is soft.   Musculoskeletal:         General: Normal range of motion.      Cervical back: Normal range of motion and neck supple.   Skin:     General: Skin is warm and dry.      Comments: Thin, nonpigmented stretch marks on inner thighs   Neurological:      General: No focal deficit present.      Mental Status: She is alert.   Psychiatric:         Mood and Affect: Mood normal.         Behavior: Behavior normal.         Thought Content: Thought content normal.         Judgment: Judgment normal.         Assessment/Plan     Diagnoses and all orders for this visit:    1. High serum cortisol (Primary)  Assessment & Plan:  She was on OCPs which can increase CBG and cause elevated total serum cortisol.  She had recent overnight dexamethasone suppression test with cortisol of 16.  This is clearly abnormal, especially since she has been off OCPs.  I would like to pursue further workup.  Check ACTH level.  Check 24 hour urine free cortisol.      Orders:  -     Cortisol; Future  -     Cortisol, Urine, Free 24Hr - Urine, Clean Catch; Future  -     ACTH; Future               Return for Will contact her with results and schedule further follow as needed at that time..    Federico Kelly MD   12/06/2021

## 2021-12-06 NOTE — ASSESSMENT & PLAN NOTE
She was on OCPs which can increase CBG and cause elevated total serum cortisol.  She had recent overnight dexamethasone suppression test with cortisol of 16.  This is clearly abnormal, especially since she has been off OCPs.  I would like to pursue further workup.  Check ACTH level.  Check 24 hour urine free cortisol.

## 2021-12-07 LAB — ACTH PLAS-MCNC: 10.6 PG/ML (ref 7.2–63.3)

## 2021-12-17 ENCOUNTER — HOSPITAL ENCOUNTER (OUTPATIENT)
Dept: ULTRASOUND IMAGING | Facility: HOSPITAL | Age: 26
End: 2021-12-17

## 2021-12-30 ENCOUNTER — HOSPITAL ENCOUNTER (OUTPATIENT)
Dept: ULTRASOUND IMAGING | Facility: HOSPITAL | Age: 26
End: 2021-12-30

## 2022-01-05 ENCOUNTER — HOSPITAL ENCOUNTER (OUTPATIENT)
Dept: ULTRASOUND IMAGING | Facility: HOSPITAL | Age: 27
Discharge: HOME | End: 2022-01-05
Admitting: INTERNAL MEDICINE

## 2022-01-05 DIAGNOSIS — K76.9 HEPATOCELLULAR INJURY: Primary | ICD-10-CM

## 2022-01-05 DIAGNOSIS — K76.9 HEPATOCELLULAR INJURY: ICD-10-CM

## 2022-01-05 PROCEDURE — 76705 ECHO EXAM OF ABDOMEN: CPT

## 2022-01-25 ENCOUNTER — LAB (OUTPATIENT)
Dept: ENDOCRINOLOGY | Facility: CLINIC | Age: 27
End: 2022-01-25

## 2022-01-25 ENCOUNTER — LAB (OUTPATIENT)
Dept: LAB | Facility: HOSPITAL | Age: 27
End: 2022-01-25

## 2022-01-25 DIAGNOSIS — R79.89 HIGH SERUM CORTISOL: ICD-10-CM

## 2022-01-25 PROCEDURE — 82530 CORTISOL FREE: CPT

## 2022-01-25 PROCEDURE — 81050 URINALYSIS VOLUME MEASURE: CPT

## 2022-02-01 DIAGNOSIS — R79.89 HIGH SERUM CORTISOL: Primary | ICD-10-CM

## 2022-02-01 LAB
CORTIS F 24H UR-MCNC: 22 UG/L
CORTIS F 24H UR-MRATE: 58 UG/24 HR (ref 6–42)

## 2022-06-29 ENCOUNTER — OFFICE VISIT (OUTPATIENT)
Dept: INTERNAL MEDICINE | Facility: CLINIC | Age: 27
End: 2022-06-29

## 2022-06-29 VITALS
TEMPERATURE: 98.1 F | HEIGHT: 70 IN | SYSTOLIC BLOOD PRESSURE: 126 MMHG | WEIGHT: 213 LBS | DIASTOLIC BLOOD PRESSURE: 74 MMHG | BODY MASS INDEX: 30.49 KG/M2 | HEART RATE: 92 BPM | OXYGEN SATURATION: 98 %

## 2022-06-29 DIAGNOSIS — Z01.419 WELL WOMAN EXAM WITH ROUTINE GYNECOLOGICAL EXAM: ICD-10-CM

## 2022-06-29 DIAGNOSIS — F41.0 SEVERE ANXIETY WITH PANIC: Primary | ICD-10-CM

## 2022-06-29 PROCEDURE — 99214 OFFICE O/P EST MOD 30 MIN: CPT | Performed by: INTERNAL MEDICINE

## 2022-06-29 RX ORDER — METOPROLOL SUCCINATE 25 MG/1
25 TABLET, EXTENDED RELEASE ORAL DAILY
Qty: 90 TABLET | Refills: 1 | Status: CANCELLED | OUTPATIENT
Start: 2022-06-29

## 2022-06-29 RX ORDER — NORGESTIMATE AND ETHINYL ESTRADIOL
1 KIT DAILY
Qty: 84 TABLET | Refills: 1 | Status: CANCELLED | OUTPATIENT
Start: 2022-06-29

## 2022-06-29 RX ORDER — BUSPIRONE HYDROCHLORIDE 5 MG/1
5 TABLET ORAL 2 TIMES DAILY
Qty: 60 TABLET | Refills: 1 | Status: SHIPPED | OUTPATIENT
Start: 2022-06-29
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
## 2022-08-16 ENCOUNTER — TELEPHONE (OUTPATIENT)
Dept: INTERNAL MEDICINE | Facility: CLINIC | Age: 27
End: 2022-08-16

## 2022-08-16 NOTE — TELEPHONE ENCOUNTER
CALLING PATIENT TO CONFIRM APPOINTMENT WITH DR. MAHONEY ON 08/17/2022 AT 11:15AM.      PLEASE CONFIRM APPT - OR RESCHEDULE IF NEEDED.    THANKS

## 2024-04-29 ENCOUNTER — PATIENT MESSAGE (OUTPATIENT)
Dept: INTERNAL MEDICINE | Facility: CLINIC | Age: 29
End: 2024-04-29
Payer: COMMERCIAL

## 2024-04-29 ENCOUNTER — TELEPHONE (OUTPATIENT)
Dept: INTERNAL MEDICINE | Facility: CLINIC | Age: 29
End: 2024-04-29
Payer: COMMERCIAL

## 2024-04-29 DIAGNOSIS — Z11.1 SCREENING-PULMONARY TB: Primary | ICD-10-CM

## 2024-04-29 NOTE — TELEPHONE ENCOUNTER
Caller: Laura Ablarado    Relationship: Self    Best call back number: [phone]     What orders are you requesting (i.e. lab or imaging): TB BLOOD TEST    Where will you receive your lab/imaging services: IN OFFICE    Additional notes: NEEDS TB TEST AS SOON AS POSSIBLE.

## 2024-04-29 NOTE — TELEPHONE ENCOUNTER
Will address TB test at her appointment. Please change to regular physical. She is established with GYN at [facility] per chart review.

## 2024-04-30 ENCOUNTER — LAB (OUTPATIENT)
Dept: LAB | Facility: HOSPITAL | Age: 29
End: 2024-04-30
Payer: COMMERCIAL

## 2024-04-30 DIAGNOSIS — Z11.1 SCREENING-PULMONARY TB: ICD-10-CM

## 2024-04-30 PROCEDURE — 86480 TB TEST CELL IMMUN MEASURE: CPT

## 2024-04-30 PROCEDURE — 36415 COLL VENOUS BLD VENIPUNCTURE: CPT

## 2024-05-02 ENCOUNTER — TELEPHONE (OUTPATIENT)
Dept: INTERNAL MEDICINE | Facility: CLINIC | Age: 29
End: 2024-05-02
Payer: COMMERCIAL

## 2024-05-02 NOTE — TELEPHONE ENCOUNTER
Caller: Nazanin Albarado    Relationship: Self    Best call back number:     Caller requesting test results: NAZANIN    What test was performed: TB SKIN TEST    When was the test performed: [id number]    Where was the test performed: [facility]    Additional notes: PLEASE CALL WITH RESULTS

## 2024-05-03 LAB
GAMMA INTERFERON BACKGROUND BLD IA-ACNC: 0.03 IU/ML
M TB IFN-G BLD-IMP: NEGATIVE
M TB IFN-G CD4+ BCKGRND COR BLD-ACNC: 0.06 IU/ML
M TB IFN-G CD4+CD8+ BCKGRND COR BLD-ACNC: 0.06 IU/ML
MITOGEN IGNF BCKGRD COR BLD-ACNC: >10 IU/ML
QUANTIFERON INCUBATION: NORMAL
SERVICE CMNT-IMP: NORMAL

## 2024-09-25 ENCOUNTER — TELEPHONE (OUTPATIENT)
Dept: INTERNAL MEDICINE | Facility: CLINIC | Age: 29
End: 2024-09-25

## 2024-10-02 ENCOUNTER — OFFICE VISIT (OUTPATIENT)
Dept: INTERNAL MEDICINE | Facility: CLINIC | Age: 29
End: 2024-10-02
Payer: COMMERCIAL

## 2024-10-02 VITALS
BODY MASS INDEX: 29.58 KG/M2 | SYSTOLIC BLOOD PRESSURE: 122 MMHG | HEART RATE: 82 BPM | HEIGHT: 70 IN | WEIGHT: 206.6 LBS | OXYGEN SATURATION: 98 % | TEMPERATURE: 97.6 F | DIASTOLIC BLOOD PRESSURE: 80 MMHG

## 2024-10-02 DIAGNOSIS — Z00.00 ANNUAL PHYSICAL EXAM: Primary | ICD-10-CM

## 2024-10-02 DIAGNOSIS — F41.0 SEVERE ANXIETY WITH PANIC: ICD-10-CM

## 2024-10-02 DIAGNOSIS — F90.9 ATTENTION DEFICIT HYPERACTIVITY DISORDER (ADHD), UNSPECIFIED ADHD TYPE: ICD-10-CM

## 2024-10-02 DIAGNOSIS — E55.9 VITAMIN D INSUFFICIENCY: ICD-10-CM

## 2024-10-02 DIAGNOSIS — Z91.030 ALLERGY TO BEE STING: ICD-10-CM

## 2024-10-02 DIAGNOSIS — Z23 NEED FOR INFLUENZA VACCINATION: ICD-10-CM

## 2024-10-02 PROBLEM — R79.89 HIGH SERUM CORTISOL: Status: RESOLVED | Noted: 2021-11-05 | Resolved: 2024-10-02

## 2024-10-02 PROBLEM — R53.82 CHRONIC FATIGUE: Status: RESOLVED | Noted: 2021-11-05 | Resolved: 2024-10-02

## 2024-10-02 PROBLEM — N92.6 MENSTRUAL IRREGULARITY: Status: RESOLVED | Noted: 2021-11-05 | Resolved: 2024-10-02

## 2024-10-02 PROBLEM — R00.2 PALPITATIONS: Status: RESOLVED | Noted: 2021-08-25 | Resolved: 2024-10-02

## 2024-10-02 PROBLEM — K76.9 HEPATOCELLULAR INJURY: Status: RESOLVED | Noted: 2021-11-05 | Resolved: 2024-10-02

## 2024-10-02 PROBLEM — R63.5 ABNORMAL WEIGHT GAIN: Status: RESOLVED | Noted: 2021-11-05 | Resolved: 2024-10-02

## 2024-10-02 PROCEDURE — 99395 PREV VISIT EST AGE 18-39: CPT | Performed by: INTERNAL MEDICINE

## 2024-10-02 PROCEDURE — 90656 IIV3 VACC NO PRSV 0.5 ML IM: CPT | Performed by: INTERNAL MEDICINE

## 2024-10-02 PROCEDURE — 90471 IMMUNIZATION ADMIN: CPT | Performed by: INTERNAL MEDICINE

## 2024-10-02 RX ORDER — EPINEPHRINE 0.3 MG/.3ML
0.3 INJECTION SUBCUTANEOUS ONCE AS NEEDED
Qty: 1 EACH | Refills: 1 | Status: SHIPPED | OUTPATIENT
Start: 2024-10-02
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
## 2024-10-14 ENCOUNTER — OFFICE VISIT (OUTPATIENT)
Dept: INTERNAL MEDICINE | Facility: CLINIC | Age: 29
End: 2024-10-14
Payer: COMMERCIAL

## 2024-10-14 VITALS
BODY MASS INDEX: 30.59 KG/M2 | TEMPERATURE: 98.6 F | HEART RATE: 74 BPM | SYSTOLIC BLOOD PRESSURE: 110 MMHG | OXYGEN SATURATION: 100 % | WEIGHT: 213.2 LBS | DIASTOLIC BLOOD PRESSURE: 86 MMHG

## 2024-10-14 DIAGNOSIS — N76.0 ACUTE VAGINITIS: ICD-10-CM

## 2024-10-14 DIAGNOSIS — Z12.4 CERVICAL CANCER SCREENING: Primary | ICD-10-CM

## 2024-10-14 PROCEDURE — 99213 OFFICE O/P EST LOW 20 MIN: CPT | Performed by: INTERNAL MEDICINE

## 2024-10-14 NOTE — PROGRESS NOTES
Internal Medicine Follow Up    Chief Complaint  Laura Albarado is a 29 y.o. female who presents today for follow up of chronic medical conditions outlined below.    Chief Complaint   Patient presents with    Gynecologic Exam        HPI  Ms. Albarado comes in today for her pap smear and breast exam. She is doing well. Does have family hx of ovarian cancer, breast cancer on maternal side. Her mom did have severe cervical dysplasia resulting in hysterectomy. She is sexually active.  has had vasectomy. She denies vaginal discharge or pain. LMP 9/16 and regular. She has had an abnormal pap smear about 10y ago and had colposcopy which was negative. Last pap smear 2018 was normal. She has had some external vulvar irritation and burning. Denies using new lubricant or soap. Wears cotton underwear. Bathes twice daily. No breast lumps, pain, or discharge.    Gynecologic Exam  The patient's pertinent negatives include no pelvic pain or vaginal discharge. Pertinent negatives include no dysuria.        Review of Systems  Review of Systems   Constitutional: Negative.    Genitourinary:  Positive for vaginal pain. Negative for breast discharge, breast lump, breast pain, decreased urine volume, difficulty urinating, dysuria, pelvic pain, pelvic pressure, vaginal bleeding and vaginal discharge.        Current Medications  Current Outpatient Medications on File Prior to Visit   Medication Sig Dispense Refill    EPINEPHrine (EpiPen 2-Shahzad) 0.3 MG/0.3ML solution auto-injector injection Inject 0.3 mL into the appropriate muscle as directed by prescriber 1 (One) Time As Needed (anaphylactic response). 1 each 1     No current facility-administered medications on file prior to visit.       Allergies  Allergies   Allergen Reactions    Bee Venom Anaphylaxis    Shellfish Allergy Itching       Objective  Visit Vitals  /86   Pulse 74   Temp 98.6 °F (37 °C) (Oral)   Wt 96.7 kg (213 lb 3.2 oz)   LMP 09/16/2024 (Exact Date)   SpO2 100%   BMI  30.59 kg/m²        Physical Exam  Physical Exam  Vitals and nursing note reviewed. Exam conducted with a chaperone present.   Constitutional:       General: She is not in acute distress.     Appearance: She is well-developed. She is obese. She is not ill-appearing or toxic-appearing.   HENT:      Head: Normocephalic and atraumatic.   Eyes:      Conjunctiva/sclera: Conjunctivae normal.   Pulmonary:      Effort: Pulmonary effort is normal. No respiratory distress.   Chest:   Breasts:     Right: Normal.      Left: Normal.   Genitourinary:     Vagina: Normal. No vaginal discharge or erythema.      Cervix: Friability and cervical bleeding present. No discharge, lesion or erythema.      Comments: Slight erythema along both labia and small tear in skin noted anteriorly.   Adnexa not palpable on bimanual exam.  Lymphadenopathy:      Upper Body:      Right upper body: No supraclavicular, axillary or pectoral adenopathy.      Left upper body: No supraclavicular, axillary or pectoral adenopathy.   Skin:     General: Skin is warm and dry.   Neurological:      Mental Status: She is alert and oriented to person, place, and time.         Results  Results for orders placed or performed in visit on 04/30/24   QuantiFERON-TB Gold Plus    Collection Time: 04/30/24 11:58 AM    Specimen: Blood   Result Value Ref Range    QuantiFERON Incubation Incubation performed.     QUANTIFERON-TB GOLD PLUS Negative Negative   QuantiFERON-TB Gold Plus    Collection Time: 04/30/24 11:58 AM    Specimen: Blood   Result Value Ref Range    QuantiFERON Criteria Comment     QUANTIFERON TB1 AG VALUE 0.06 IU/mL    QUANTIFERON TB2 AG VALUE 0.06 IU/mL    QuantiFERON Nil Value 0.03 IU/mL    QuantiFERON Mitogen Value >10.00 IU/mL        Assessment and Plan  Diagnoses and all orders for this visit:    Cervical cancer screening  - pap smear today    Acute vaginitis  - external irritation and pain, cervix friable on exam  - will send for BV, yeast,  trichomonas    Health Maintenance  - Pap smear: today  - Mammogram: Start screening at age 40.  - Colonoscopy: Start screening at age 45.  - HCV: negative  - Immunizations: flu UTD. COVID and Tdap deferred.  - Depression screening: negative 10/2024    Return in about 1 year (around 10/14/2025) for Annual.

## 2024-10-21 LAB — REF LAB TEST METHOD: NORMAL

## 2024-10-25 ENCOUNTER — TELEPHONE (OUTPATIENT)
Dept: INTERNAL MEDICINE | Facility: CLINIC | Age: 29
End: 2024-10-25
Payer: COMMERCIAL

## 2024-10-25 DIAGNOSIS — F51.04 PSYCHOPHYSIOLOGICAL INSOMNIA: Primary | ICD-10-CM

## 2024-10-25 RX ORDER — HYDROXYZINE HYDROCHLORIDE 25 MG/1
25-50 TABLET, FILM COATED ORAL NIGHTLY PRN
Qty: 30 TABLET | Refills: 1 | Status: SHIPPED | OUTPATIENT
Start: 2024-10-25

## 2024-10-25 NOTE — TELEPHONE ENCOUNTER
Pt returned call. Gave message from provider. She will try the hydroxyzine  and if it does not work she will get back in touch with us. Pt voiced deep appreciation for provider's care.

## 2024-10-25 NOTE — TELEPHONE ENCOUNTER
Caller: Laura Albarado    Relationship: Self    Best call back number: [phone]     What medication are you requesting: SOMETHING FOR SYMPTOMS    What are your current symptoms: NOT SLEEPING         If a prescription is needed, what is your preferred pharmacy and phone number: [id number] - [place], [place] - [address] [facility] [phone] [facility] [phone] [facility]     Additional notes:PATIENTS BROTHER  3 DAYS AGO AND SHE CANNOT SLEEP

## 2024-10-29 LAB — REF LAB TEST METHOD: NORMAL

## 2024-11-14 ENCOUNTER — OFFICE VISIT (OUTPATIENT)
Age: 29
End: 2024-11-14
Payer: COMMERCIAL

## 2024-11-14 VITALS
DIASTOLIC BLOOD PRESSURE: 84 MMHG | HEIGHT: 70 IN | BODY MASS INDEX: 30.21 KG/M2 | WEIGHT: 211 LBS | OXYGEN SATURATION: 99 % | HEART RATE: 105 BPM | SYSTOLIC BLOOD PRESSURE: 138 MMHG

## 2024-11-14 DIAGNOSIS — F90.0 ATTENTION DEFICIT HYPERACTIVITY DISORDER (ADHD), PREDOMINANTLY INATTENTIVE TYPE: Primary | Chronic | ICD-10-CM

## 2024-11-14 DIAGNOSIS — Z51.81 ENCOUNTER FOR THERAPEUTIC DRUG MONITORING: ICD-10-CM

## 2024-11-14 RX ORDER — DEXTROAMPHETAMINE SACCHARATE, AMPHETAMINE ASPARTATE MONOHYDRATE, DEXTROAMPHETAMINE SULFATE AND AMPHETAMINE SULFATE 2.5; 2.5; 2.5; 2.5 MG/1; MG/1; MG/1; MG/1
10 CAPSULE, EXTENDED RELEASE ORAL DAILY
Qty: 30 CAPSULE | Refills: 0 | Status: SHIPPED | OUTPATIENT
Start: 2024-11-14 | End: 2025-11-14
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
## 2024-11-15 LAB — Lab: NORMAL

## 2024-11-20 LAB
1OH-MIDAZOLAM UR QL SCN: NOT DETECTED NG/MG CREAT
6MAM UR QL SCN: NEGATIVE NG/ML
7AMINOCLONAZEPAM/CREAT UR: NOT DETECTED NG/MG CREAT
A-OH ALPRAZ/CREAT UR: NOT DETECTED NG/MG CREAT
A-OH-TRIAZOLAM/CREAT UR CFM: NOT DETECTED NG/MG CREAT
ACP UR QL CFM: NOT DETECTED
ALPRAZ/CREAT UR CFM: NOT DETECTED NG/MG CREAT
AMPHETAMINES UR QL SCN: NEGATIVE NG/ML
APAP UR QL SCN: NEGATIVE UG/ML
BARBITURATES UR QL SCN: NEGATIVE NG/ML
BENZODIAZ SCN METH UR: NEGATIVE
BUPRENORPHINE UR QL SCN: NEGATIVE
BUPRENORPHINE/CREAT UR: NOT DETECTED NG/MG CREAT
CANNABINOIDS UR QL SCN: NEGATIVE NG/ML
CARISOPRODOL UR QL: NEGATIVE NG/ML
CLONAZEPAM/CREAT UR CFM: NOT DETECTED NG/MG CREAT
COCAINE+BZE UR QL SCN: NEGATIVE NG/ML
CREAT UR-MCNC: 154 MG/DL
D-METHORPHAN UR-MCNC: NOT DETECTED NG/ML
D-METHORPHAN+LEVORPHANOL UR QL: NOT DETECTED
DESALKYLFLURAZ/CREAT UR: NOT DETECTED NG/MG CREAT
DIAZEPAM/CREAT UR: NOT DETECTED NG/MG CREAT
ETG ETS UR QL CFM: NORMAL
ETHANOL UR QL SCN: NEGATIVE G/DL
ETHANOL UR QL SCN: NORMAL NG/ML
ETHYL GLUCURONIDE UR CFM-MCNC: NORMAL NG/MG CREAT
ETHYL SULFATE UR CFM-MCNC: NORMAL NG/MG CREAT
FENTANYL CTO UR SCN-MCNC: NEGATIVE NG/ML
FENTANYL/CREAT UR: NOT DETECTED NG/MG CREAT
FLUNITRAZEPAM UR QL SCN: NOT DETECTED NG/MG CREAT
GABAPENTIN UR-MCNC: NEGATIVE UG/ML
HALLUCINOGENS UR: NEGATIVE
HYPNOTICS UR QL SCN: NEGATIVE
KETAMINE UR QL: NOT DETECTED
LORAZEPAM/CREAT UR: NOT DETECTED NG/MG CREAT
MEPERIDINE UR QL SCN: NEGATIVE NG/ML
METHADONE UR QL SCN: NEGATIVE NG/ML
METHADONE+METAB UR QL SCN: NEGATIVE NG/ML
MIDAZOLAM/CREAT UR CFM: NOT DETECTED NG/MG CREAT
MISCELLANEOUS, UR: NEGATIVE
NORBUPRENORPHINE/CREAT UR: NOT DETECTED NG/MG CREAT
NORDIAZEPAM/CREAT UR: NOT DETECTED NG/MG CREAT
NORFENTANYL/CREAT UR: NOT DETECTED NG/MG CREAT
NORFLUNITRAZEPAM UR-MCNC: NOT DETECTED NG/MG CREAT
NORKETAMINE UR-MCNC: NOT DETECTED UG/ML
OPIATES UR SCN-MCNC: NEGATIVE NG/ML
OXAZEPAM/CREAT UR: NOT DETECTED NG/MG CREAT
OXYCODONE CTO UR SCN-MCNC: NEGATIVE NG/ML
PCP UR QL SCN: NEGATIVE NG/ML
PRESCRIBED MEDICATIONS: NORMAL
PROPOXYPH UR QL SCN: NEGATIVE NG/ML
TAPENTADOL CTO UR SCN-MCNC: NEGATIVE NG/ML
TEMAZEPAM/CREAT UR: NOT DETECTED NG/MG CREAT
TRAMADOL UR QL SCN: NEGATIVE NG/ML
WRITTEN AUTHORIZATION: NORMAL
ZALEPLON UR-MCNC: NOT DETECTED NG/ML
ZOLPIDEM PHENYL-4-CARB UR QL SCN: NOT DETECTED
ZOLPIDEM UR QL SCN: NOT DETECTED
ZOPICLONE-N-OXIDE UR-MCNC: NOT DETECTED NG/ML

## 2024-11-21 ENCOUNTER — TELEPHONE (OUTPATIENT)
Age: 29
End: 2024-11-21
Payer: COMMERCIAL

## 2024-11-21 DIAGNOSIS — F90.0 ATTENTION DEFICIT HYPERACTIVITY DISORDER (ADHD), PREDOMINANTLY INATTENTIVE TYPE: Chronic | ICD-10-CM

## 2024-11-21 RX ORDER — DEXTROAMPHETAMINE SACCHARATE, AMPHETAMINE ASPARTATE MONOHYDRATE, DEXTROAMPHETAMINE SULFATE AND AMPHETAMINE SULFATE 2.5; 2.5; 2.5; 2.5 MG/1; MG/1; MG/1; MG/1
10 CAPSULE, EXTENDED RELEASE ORAL DAILY
Qty: 30 CAPSULE | Refills: 0 | Status: SHIPPED | OUTPATIENT
Start: 2024-11-21 | End: 2025-11-21

## 2024-11-21 NOTE — TELEPHONE ENCOUNTER
Called patient to advise Rx refill has been sent to pharmacy on file. No answer, left VM. Advised patient to call clinic [phone] if needed.

## 2025-02-18 ENCOUNTER — OFFICE VISIT (OUTPATIENT)
Age: 30
End: 2025-02-18
Payer: COMMERCIAL

## 2025-02-18 VITALS
DIASTOLIC BLOOD PRESSURE: 82 MMHG | OXYGEN SATURATION: 98 % | BODY MASS INDEX: 30.86 KG/M2 | HEART RATE: 87 BPM | WEIGHT: 215.6 LBS | SYSTOLIC BLOOD PRESSURE: 118 MMHG | HEIGHT: 70 IN

## 2025-02-18 DIAGNOSIS — F90.0 ATTENTION DEFICIT HYPERACTIVITY DISORDER (ADHD), PREDOMINANTLY INATTENTIVE TYPE: Primary | ICD-10-CM

## 2025-02-18 RX ORDER — ONDANSETRON 4 MG/1
TABLET, ORALLY DISINTEGRATING ORAL
COMMUNITY
Start: 2024-12-20

## 2025-02-18 RX ORDER — DEXTROAMPHETAMINE SACCHARATE, AMPHETAMINE ASPARTATE MONOHYDRATE, DEXTROAMPHETAMINE SULFATE AND AMPHETAMINE SULFATE 3.75; 3.75; 3.75; 3.75 MG/1; MG/1; MG/1; MG/1
15 CAPSULE, EXTENDED RELEASE ORAL DAILY
Qty: 30 CAPSULE | Refills: 0 | Status: SHIPPED | OUTPATIENT
Start: 2025-02-18 | End: 2026-02-18

## 2025-02-18 NOTE — PROGRESS NOTES
[facility]             Follow Up Office Visit      Patient Name: Laura Albarado  : 1995   MRN: [id number]     Referring Provider: Oneida Reina MD    Chief Complaint:      ICD-10-CM ICD-9-CM   1. Attention deficit hyperactivity disorder (ADHD), predominantly inattentive type  F90.0 314.00      History of Present Illness:   Laura Albarado is a 29 y.o. female   History of Present Illness    Patient is seen and evaluated in the office. She appears to be in no acute distress at this time. She is calm and cooperative with the evaluation, and exhibits a linear and goal-directed thought process. She reports a general sense of well-being over the past few months, with no significant changes in her condition. She experienced some difficulty in initially obtaining her medication due to unavailability at the pharmacy. She expresses a desire to increase her dosage slightly, as she has observed positive changes in her behavior, which have been noted by her supervisors and partner. She has been comparing her work performance with and without the medication and believes it has been beneficial. Her mood and anxiety levels have shown improvement, particularly following the death of her brother a few months ago. Her sleep quality has also improved, and she has not required frequent use of hydroxyzine. She continues to experience some anxiety, which she attributes to her brother's death, but does not report any other triggers. She is currently undergoing therapy, which she finds helpful. She is planning a trip tomorrow and does not require a refill of hydroxyzine at this time. She denies any SI, intent or plan. We will increase Adderall to 15 mg po daily and follow up in 3-4 mo.      Previous Medication Trials:  Adderall     Subjective      Review of Systems:   Review of Systems   Constitutional:  Negative for chills, fatigue and fever.   HENT:  Negative for congestion, hearing loss,  "sore throat and trouble swallowing.    Eyes:  Negative for blurred vision and double vision.   Respiratory:  Negative for cough, chest tightness and shortness of breath.    Cardiovascular:  Negative for chest pain and palpitations.   Gastrointestinal:  Negative for abdominal pain, constipation, diarrhea, nausea and vomiting.   Endocrine: Negative for polydipsia and polyuria.   Genitourinary:  Negative for hematuria and urgency.   Musculoskeletal:  Negative for arthralgias.   Skin:  Negative for skin lesions and bruise.   Neurological:  Negative for dizziness, tremors, seizures and light-headedness.   Hematological:  Negative for adenopathy.     Screening Scores:   PHQ-9 : 2  FRANCO-7 : 1    Medications:     Current Outpatient Medications:     EPINEPHrine (EpiPen 2-Shahzad) 0.3 MG/0.3ML solution auto-injector injection, Inject 0.3 mL into the appropriate muscle as directed by prescriber 1 (One) Time As Needed (anaphylactic response)., Disp: 1 each, Rfl: 1    hydrOXYzine (ATARAX) 25 MG tablet, Take 1-2 tablets by mouth At Night As Needed (sleep)., Disp: 30 tablet, Rfl: 1    ondansetron ODT (ZOFRAN-ODT) 4 MG disintegrating tablet, DISSOLVE 1 TABLET UNDER THE TONGUE EVERY 6 HOURS AS NEEDED, Disp: , Rfl:     amphetamine-dextroamphetamine XR (Adderall XR) 15 MG 24 hr capsule, Take 1 capsule by mouth Daily, Disp: 30 capsule, Rfl: 0    Medication Considerations:  SHERRY reviewed and appropriate.     Allergies:   Allergies   Allergen Reactions    Bee Venom Anaphylaxis    Shellfish Allergy Itching     Objective     Vital Signs:   Vitals:    02/18/25 1139   BP: 118/82   Pulse: 87   SpO2: 98%   Weight: 97.8 kg (215 lb 9.6 oz)   Height: 177.8 cm (70\")     Body mass index is 30.94 kg/m².     Mental Status Exam:   MENTAL STATUS EXAM   General Appearance:  Cleanly groomed and dressed  Eye Contact:  Good eye contact  Attitude:  Cooperative  Motor Activity:  Normal gait, posture  Muscle Strength:  Normal  Speech:  Normal rate, tone, " volume  Language:  Spontaneous  Mood and affect:  Normal, pleasant and appropriate  Hopelessness:  Denies  Thought Process:  Logical and goal-directed  Associations/ Thought Content:  No delusions  Hallucinations:  None  Suicidal Ideations:  Not present  Homicidal Ideation:  Not present  Sensorium:  Alert  Orientation:  Person, place, time and situation  Immediate Recall, Recent, and Remote Memory:  Intact  Attention Span/ Concentration:  Good  Fund of Knowledge:  Appropriate for age and educational level  Intellectual Functioning:  Average range  Insight:  Fair  Judgement:  Fair  Reliability:  Fair  Impulse Control:  Fair      SUICIDE RISK ASSESSMENT/CSSRS:  1. Does patient have thoughts of suicide? denies  2. Does patient have intent for suicide? denies  3. Does patient have a current plan for suicide? denies  4. History of suicide attempts: denies  5. Family history of suicide or attempts: denies  6. History of violent behaviors towards others or property or thoughts of committing suicide: denies  7. History of sexual aggression toward others: denies  8. Access to firearms or weapons: denies    Assessment / Plan      Visit Diagnosis/Orders Placed This Visit:  Diagnoses and all orders for this visit:   Assessment & Plan  1. Anxiety.  Her anxiety appears to be well-managed at present. She reports improvement in her mood and anxiety levels, attributing this to her current medication regimen and therapy sessions. She has been using hydroxyzine less frequently, which indicates better control of her symptoms. A prescription for her medication will be sent to the [facility] in [place], with an increased dosage of 15 mg. She is advised to contact the clinic after 03/18/2025 for a 90-day refill of her medication, which will last until July 2025 due to providers maternity leave. She is also encouraged to reach out if she requires assistance before March 2025.    Follow-up  The patient will follow up in July 2025.      1. Attention deficit hyperactivity disorder (ADHD), predominantly inattentive type (Primary)  - Increase Adderall XR to 15 mg po daily  - CPT completed and reviewed with patient: shows moderate likelihood of ADHD  - CSA signed 11/14/24  - Continue therapy  - Follow up with writer in 3-4 mo  Chart Reviewed      Functional Status: Mild impairment      Prognosis: Fair with Ongoing Treatment     Impression/Formulation:  Patient appeared alert and oriented. Patient is receptive to assistance with maintaining a stable lifestyle.  Patient presents with history of     ICD-10-CM ICD-9-CM   1. Attention deficit hyperactivity disorder (ADHD), predominantly inattentive type  F90.0 314.00     Treatment Plan:     Patient will continue supportive psychotherapy efforts and medications as indicated. Clinic will obtain release of information for current treatment team for continuity of care as needed. Patient will contact this office, call 911 or present to the nearest emergency room should suicidal or homicidal ideations occur.  Discussed medication options and treatment plan of prescribed medication(s) as well as the risks, benefits, and potential side effects. Patient ackowledged and verbally consented to continue with current treatment plan and was educated on the importance of compliance with treatment and follow-up appointments.     Quality Measures:  Tobacco: Laura Albarado  reports that she has never smoked. She has never used smokeless tobacco. I have educated her on the risk of diseases from using tobacco products such as cancer, COPD, and heart disease.     Depression (PHQ >11): Patient screened positive for depression based on a PHQ-9 score of 2 on 2/18/2025. Follow-up recommendations include:  follow up with writer in 1 mo, continue medications as prescribed, continue therapy .     Follow Up:   Return in about 4 months (around 6/18/2025) for Medication Management, follow up with therapy.    Short-term goals: Patient  will adhere to medication regimen and experience continued improvement in symptoms over the next 3 months.   Long-term goals: Patient will adhere to medication treatment plan and report improvement in symptoms over the next 6 months    Charlette Gonzales MD  [facility]     This is electronically signed by Charlette Gonzales MD     Patient or patient representative verbalized consent for the use of Ambient Listening during the visit with  Charlette Gonzales MD for chart documentation. 2/18/2025  11:38 EST